# Patient Record
(demographics unavailable — no encounter records)

---

## 2018-06-27 NOTE — MAMMOGRAPHY REPORT
BILATERAL DIGITAL SCREENING MAMMOGRAM with CAD: 06/26/18



CLINICAL: Routine screening.



COMPARISON:None available.  However, a prior mammogram was apparently 

done at [facility].



FINDINGS: The breasts are mostly fatty.Right parenchymal asymmetries 

require comparison with the prior mammogram for additional imaging.The 

left breast is negative.



IMPRESSION: Right asymmetries requiring further evaluation.



BI-RADS CATEGORY:  0 -- Additional Evaluation Required



RECOMMENDATION: Comparison with a previous mammogram.



We will attempt to obtain a prior mammogram for comparison.  If we do 

not obtain a prior mammogram  within 30 days, a revised report will be 

issued recommending a recall for additional imaging. Please be advised 

that the patient should not schedule an appointment for return until 

adequate time (at least 2 weeks) has passed for us to obtain the prior 

mammogram.



ACR BI-RADS MAMMOGRAPHIC CODES:

0 = Needs additional imaging evaluation; 1 = Negative; 2 = Benign; 3 = 

Probably benign; 4 = Suspicious; 5 = Malignant; 6 = Known biopsy-proven 

malignancy



COMMENT:

      1.   Dense breast tissue, i.e., adenosis, fibrocystic 

            changes, etc., may obscure an underlying neoplasm.

      2.   Approximately 10% of cancers are not detected with

            mammography.

      3.   A negative mammography report should not delay biopsy 

            if a clinically suspicious mass is present.



COMMENT:

Patient follow-up letters are generated via our [employer] application.

## 2019-04-17 NOTE — EMERGENCY DEPARTMENT REPORT
ED Abdominal Pain HPI





- General


Chief Complaint: Nausea/Vomiting/Diarrhea


Stated Complaint: DOCTOR REFERRAL


Time Seen by Provider: 04/17/19 15:52


Source: patient


Mode of arrival: Ambulatory


Limitations: No Limitations





- History of Present Illness


Initial Comments: 





She is a very pleasant 49-year-old -American female who comes to the ER 

complaining of a 9 day history of nausea and epigastric pain.  She reports a 7 

pound weight loss.  Patient is morbidly obese and with diabetes.  Patient states

that she has a prior history of gastric ulcer and she states that her pain is 

similar to that pain and she suspects that her use of NSAIDs including Motrin, 

Goody's and diclofenac have contributed.  She uses these for chronic pain.  

Patient has no epigastric tenderness.  She is ambulatory, taking by mouth and 

nontoxic on exam.  She has no fever on admission to the ER.


MD Complaint: abdominal pain


-: Gradual, week(s)


Location: epigastric


Severity scale (0 -10): 0


Quality: aching


Consistency: intermittent


Worsens With: eating


Associated Symptoms: nausea


Treatments Prior to Arrival: NSAIDs





- Related Data


                                  Previous Rx's











 Medication  Instructions  Recorded  Last Taken  Type


 


Omeprazole 20 mg PO DAILY #30 tablet. 04/17/19 Unknown Rx











                                    Allergies











Allergy/AdvReac Type Severity Reaction Status Date / Time


 


Penicillins Allergy  Angioedema Verified 10/25/17 14:11














ED Review of Systems


ROS: 


Stated complaint: DOCTOR REFERRAL


Other details as noted in HPI





Comment: All other systems reviewed and negative





ED Past Medical Hx





- Past Medical History


Previous Medical History?: Yes


Hx Hypertension: Yes


Hx Diabetes: Yes


Hx Psychiatric Treatment: Yes (bipolar)


Additional medical history: multiple sclerosis [id number]





- Surgical History


Past Surgical History?: Yes


Additional Surgical History: tubal ligation 1995





- Family History


Family history: no significant





- Social History


Smoking Status: Never Smoker


Substance Use Type: None





- Medications


Home Medications: 


                                Home Medications











 Medication  Instructions  Recorded  Confirmed  Last Taken  Type


 


Omeprazole 20 mg PO DAILY #30 tablet. 04/17/19  Unknown Rx














ED Physical Exam





- General


Limitations: No Limitations


General appearance: alert, in no apparent distress





- Head


Head exam: Present: normocephalic





- Eye


Eye exam: Present: normal appearance, PERRL





- ENT


ENT exam: Present: mucous membranes moist





- Neck


Neck exam: Present: normal inspection





- Respiratory


Respiratory exam: Present: normal lung sounds bilaterally





- Cardiovascular


Cardiovascular Exam: Present: regular rate, tachycardia (100 ON PROVIDER EXAM; 

MORBIDLY OBESE)





- GI/Abdominal


GI/Abdominal exam: Present: soft, normal bowel sounds.  Absent: distended, 

tenderness, guarding, rebound, rigid, diminished bowel sounds, hyperactive bowel

sounds, hypoactive bowel sounds, organomegaly, mass, bruit, pulsatile mass, 

hernia





- Rectal


Rectal exam: Present: deferred





- Extremities Exam


Extremities exam: Present: normal inspection, full ROM





- Back Exam


Back exam: Present: normal inspection, full ROM





- Neurological Exam


Neurological exam: Present: alert, oriented X3, CN II-XII intact, normal gait





- Psychiatric


Psychiatric exam: Present: normal affect, normal mood





- Skin


Skin exam: Present: warm, dry, intact





ED Course


                                   Vital Signs











  04/17/19





  15:51


 


Temperature 98.0 F


 


Pulse Rate 121 H


 


Respiratory 16





Rate 


 


Blood Pressure 151/79





[Right] 


 


O2 Sat by Pulse 97





Oximetry 














- Reevaluation(s)


Reevaluation #1: 





04/17/19 19:39


USUAL HEALTH[facility]ARE AT [facility]





RX


METFORMIN


GLIPIZIDE


LISINOPRIL


NSAIDS





PMH


DM


OBESE


HTN


G ULCERS


MS


BIPOLAR








Reevaluation #2: 





04/17/19 19:39


ON DC PT IS PAINFREE AND VERBALIZES UNDERSTANDING OF DISCHARGE INSTRUCTIONS. 





ED Medical Decision Making





- Lab Data


Result diagrams: 


                                 04/17/19 16:12





                                 04/17/19 16:18





- Radiology Data


Radiology results: report reviewed, image reviewed





- Medical Decision Making








Labs











  04/17/19 04/17/19 04/17/19





  16:12 16:12 16:12


 


WBC  4.5  


 


RBC  4.81  


 


Hgb  13.0  


 


Hct  39.2  


 


MCV  82  


 


MCH  27 L  


 


MCHC  33  


 


RDW  14.8  


 


Plt Count  364  


 


Lymph % (Auto)  51.0 H  


 


Mono % (Auto)  7.7 H  


 


Eos % (Auto)  1.9  


 


Baso % (Auto)  0.9  


 


Lymph #  2.3  


 


Mono #  0.3  


 


Eos #  0.1  


 


Baso #  0.0  


 


Seg Neutrophils %  38.5 L  


 


Seg Neutrophils #  1.7 L  


 


VBG pH    7.379


 


Sodium   


 


Potassium   


 


Chloride   


 


Carbon Dioxide   


 


Anion Gap   


 


BUN   


 


Creatinine   


 


Estimated GFR   


 


BUN/Creatinine Ratio   


 


Glucose   


 


Calcium   


 


Total Bilirubin   


 


Direct Bilirubin   


 


Indirect Bilirubin   


 


AST   


 


ALT   


 


Alkaline Phosphatase   


 


Total Protein   


 


Albumin   


 


Albumin/Globulin Ratio   


 


Lipase   


 


HCG, Qual   Negative 


 


Urine Color   


 


Urine Turbidity   


 


Urine pH   


 


Ur Specific Gravity   


 


Urine Protein   


 


Urine Glucose (UA)   


 


Urine Ketones   


 


Urine Blood   


 


Urine Nitrite   


 


Urine Bilirubin   


 


Urine Urobilinogen   


 


Ur Leukocyte Esterase   


 


Urine WBC (Auto)   


 


Urine RBC (Auto)   


 


U Epithel Cells (Auto)   


 


Urine Mucus   














  04/17/19 04/17/19





  16:18 Unknown


 


WBC  


 


RBC  


 


Hgb  


 


Hct  


 


MCV  


 


MCH  


 


MCHC  


 


RDW  


 


Plt Count  


 


Lymph % (Auto)  


 


Mono % (Auto)  


 


Eos % (Auto)  


 


Baso % (Auto)  


 


Lymph #  


 


Mono #  


 


Eos #  


 


Baso #  


 


Seg Neutrophils %  


 


Seg Neutrophils #  


 


VBG pH  


 


Sodium  137 


 


Potassium  4.2 


 


Chloride  102.8 


 


Carbon Dioxide  20 L 


 


Anion Gap  18 


 


BUN  7 


 


Creatinine  1.1 


 


Estimated GFR  > 60 


 


BUN/Creatinine Ratio  6 


 


Glucose  124 H 


 


Calcium  9.8 


 


Total Bilirubin  0.40 


 


Direct Bilirubin  < 0.2 


 


Indirect Bilirubin  0.2 


 


AST  21 


 


ALT  37 


 


Alkaline Phosphatase  153 H 


 


Total Protein  7.4 


 


Albumin  4.3 


 


Albumin/Globulin Ratio  1.4 


 


Lipase  18 


 


HCG, Qual  


 


Urine Color   Yellow


 


Urine Turbidity   Slightly-cloudy


 


Urine pH   5.0


 


Ur Specific Gravity   1.012


 


Urine Protein   100 mg/dl


 


Urine Glucose (UA)   Neg


 


Urine Ketones   Neg


 


Urine Blood   Lg


 


Urine Nitrite   Neg


 


Urine Bilirubin   Neg


 


Urine Urobilinogen   < 2.0


 


Ur Leukocyte Esterase   Neg


 


Urine WBC (Auto)   21.0 H


 


Urine RBC (Auto)   > 182.0


 


U Epithel Cells (Auto)   9.0


 


Urine Mucus   Few











                                   Vital Signs











  04/17/19





  15:51


 


Temperature 98.0 F


 


Pulse Rate 121 H


 


Respiratory 16





Rate 


 


Blood Pressure 151/79





[Right] 


 


O2 Sat by Pulse 97





Oximetry 








XRAY NO FREE AIR UNDER DIAPHRAGM





GI COCKTAIL RELIVED PAIN





AMBULATORY


NON TOXIC 


TAKING PO WHILE IN ED





DISCUSSED PLAN OF CARE WITH PT AND HER SISTER. 


WILL DC HOME ON BLAND DIET


SHE WILL MONITOR HER BLOOD SUGARS


PPI DAILY


FOLLOW UP WITH GI- PT HAS HAD A COLONOSCOPY BUT NO ENDOSCOPY IN THE PAST. 


REFERRAL GIVEN


Critical care attestation.: 


If time is entered above; I have spent that time in minutes in the direct care 

of this critically ill patient, excluding procedure time.








ED Disposition


Clinical Impression: 


 GERD (gastroesophageal reflux disease)





Disposition: DC-01 TO HOME OR SELFCARE


Is pt being admited?: No


Does the pt Need Aspirin: No


Condition: Stable


Instructions:  Diet for Ulcers and Gastritis (ED), Gastroesophageal Reflux 

Disease (ED)


Additional Instructions: 








AVOID NSAIDS





HYDRATE WELL WITH WATER





BLAND DIET


BANANA


RICE 


APPLESAUCE 


TOAST





FOLLOW UP WITH GI


REFERRAL BELOW








Prescriptions: 


Omeprazole 20 mg PO DAILY #30 tablet.


Referrals: 


[facility] [Primary Care Provider] - 3-5 Days


JOSEFA BRANTLEY MD [Staff Physician] - 3-5 Days


Time of Disposition: 19:28

## 2019-04-17 NOTE — XRAY REPORT
PROCEDURE: Abdominal series. 

 

TECHNIQUE:  Supine and upright views of the abdomen, PA chest. 

 

HISTORY: Abdominal pain. 

 

COMPARISONS: None.  

 

FINDINGS: 

 

The heart and mediastinum appear normal. The lungs are clear and well expanded. There are no pleural 
effusions. There is no evidence of pneumoperitoneum. The bowel gas pattern is normal. The soft tissue
s and regional skeleton are unremarkable. 

 

IMPRESSION:  

 

Normal abdominal series. 

 

This document is electronically signed by Jayden Temple MD., April 17 2019 07:01:28 PM ET

## 2019-04-17 NOTE — EMERGENCY DEPARTMENT REPORT
Blank Doc





- Documentation


Documentation: 





This is a 49-year-old female that presents with nausea/vomiting and abdominal 

pain x9 days.  Stated was sent by PCP.





This initial assessment/diagnostic orders/clinical plan/treatment(s) is/are 

subject to change based on patient's health status, clinical progression and 

re-assessment by fellow clinical providers in the ED.  Further treatment and 

workup at subsequent clinical providers discretion.  Patient/guardians urged not

to elope from the ED as their condition may be serious if not clinically 

assessed and managed.  Initial orders include:


1- Patient sent to [facility] for further evaluation and treatment


2- labs


3- UA